# Patient Record
Sex: MALE | URBAN - METROPOLITAN AREA
[De-identification: names, ages, dates, MRNs, and addresses within clinical notes are randomized per-mention and may not be internally consistent; named-entity substitution may affect disease eponyms.]

---

## 2017-09-28 ENCOUNTER — TRANSFERRED RECORDS (OUTPATIENT)
Dept: HEALTH INFORMATION MANAGEMENT | Facility: CLINIC | Age: 37
End: 2017-09-28

## 2017-09-28 ENCOUNTER — MEDICAL CORRESPONDENCE (OUTPATIENT)
Dept: HEALTH INFORMATION MANAGEMENT | Facility: CLINIC | Age: 37
End: 2017-09-28

## 2017-10-04 ENCOUNTER — TRANSFERRED RECORDS (OUTPATIENT)
Dept: HEALTH INFORMATION MANAGEMENT | Facility: CLINIC | Age: 37
End: 2017-10-04

## 2017-12-19 ENCOUNTER — TRANSFERRED RECORDS (OUTPATIENT)
Dept: HEALTH INFORMATION MANAGEMENT | Facility: CLINIC | Age: 37
End: 2017-12-19

## 2018-01-04 NOTE — TELEPHONE ENCOUNTER
APPT INFO    Date /Time: 1/8/18 9:45AM   Reason for Appt: L hand Flexor Rupture in 2015 at [facility]   Ref Provider/Clinic: EL[facility]N/ [facility]    Are there internal records? Yes/No?  IF YES, list clinic names: NO   Are there outside records? Yes/No? YES- see below   Patient Contact (Y/N) & Call Details: NO- referral   Action: Reviewed records     OUTSIDE RECORDS CHECKLIST     CLINIC NAME COMMENTS REC (x) IMG (x)   [facility]              Records Received From: [facility]    Date/Exam/Location  (specify location if different)   Office Notes: 12/19/17   Radiology Reports: - MRI upper ext left 10/4/17  - left hand 9/28/17    Olivia Wheat Notified (Y/N):      ACTION    What did you do? Faxed request for additional records/images

## 2018-01-05 NOTE — TELEPHONE ENCOUNTER
Received Imaging From: [facility]    Image Type (x): Disc:__1_  Pacs:___      Exam Date/Name: 9/28/17 xray L Hand  10/4/17 MR Left Hand Comments: to film room

## 2018-01-08 ENCOUNTER — PRE VISIT (OUTPATIENT)
Dept: ORTHOPEDICS | Facility: CLINIC | Age: 38
End: 2018-01-08

## 2018-01-08 ENCOUNTER — OFFICE VISIT (OUTPATIENT)
Dept: ORTHOPEDICS | Facility: CLINIC | Age: 38
End: 2018-01-08
Payer: COMMERCIAL

## 2018-01-08 VITALS — BODY MASS INDEX: 32.77 KG/M2 | WEIGHT: 203.9 LBS | HEIGHT: 66 IN

## 2018-01-08 DIAGNOSIS — M24.542 CONTRACTURE OF HAND JOINT, LEFT: Primary | ICD-10-CM

## 2018-01-08 ASSESSMENT — ENCOUNTER SYMPTOMS
NECK PAIN: 0
DOUBLE VISION: 0
MUSCLE WEAKNESS: 0
MUSCLE CRAMPS: 0
BACK PAIN: 0
INSOMNIA: 1
JOINT SWELLING: 0
DEPRESSION: 1
EYE WATERING: 1
EYE PAIN: 1
MYALGIAS: 0
ARTHRALGIAS: 1
EYE IRRITATION: 1
NERVOUS/ANXIOUS: 1
DECREASED CONCENTRATION: 1
PANIC: 0
EYE REDNESS: 0
STIFFNESS: 0

## 2018-01-08 NOTE — LETTER
1/8/2018       RE: Feliz Pretty  [address]  [place] [address]     Dear Colleague,    Thank you for referring your patient, Feliz Pretty, to the [facility] at [facility]. Please see a copy of my visit note below.    Date of Service: Jan 8, 2018    Chief Complaint:   Chief Complaint   Patient presents with     Consult     pt states he had surgery November 2014 oh his 4th and 5th phalanx tendon repair done, and since then he is unable to to bend his left ring finger and left small finger,pt states there is minimal pain worse waking up in the am       History of Present Illness: Feliz Pretty is a 37 year old right hand dominant male who presents today for further evaluation of a left hand contracture.  He reports that he was in his usual state of health until November 2014 when a jar broke, injuring the flexor tendons to the small and ring fingers in the nerve to small finger.  Surgery was done to repair the tendons at the [facility].  However, during the therapy session approximately 2 weeks after surgery, the repair ruptured.  He was taken back to surgery and the repair was revised.  However, he moved to [place] shortly after this so had only 2 therapy sessions.  He believes that during 1 of these therapy sessions, the repair to his ring finger may have ruptured again.  He reports a fixed flexion deformity of the small and ring finger since this time.  He is currently a student in journalism.  He does not have much pain in the fingers and it doesn't really effect his daily life but has difficulty putting on gloves.  He also has discomfort when he wakes up in the morning.  He is currently on disability for PTSD. He wants to know if anything can be done to improve his hand function.     Review of Systems: A 14-point review of systems was obtained on intake reviewed. It is included at the bottom of this note.     Past Medical History:  "  Diagnosis Date     Mental disorder          Past Surgical History:   Procedure Laterality Date     C STOMACH SURGERY PROCEDURE UNLISTED         No current outpatient prescriptions on file.    Not on File    Social History     Social History     Marital status: Single     Spouse name: N/A     Number of children: N/A     Years of education: N/A     Social History Main Topics     Smoking status: Never Smoker     Smokeless tobacco: Never Used     Alcohol use Yes     Drug use: No     Sexual activity: Yes     Partners: Female     Birth control/ protection: Condom     Other Topics Concern     None     Social History Narrative     None     Occupation:     No family history on file.    Physical examination:  Height 1.676 m (5' 6\"), weight 92.5 kg (203 lb 14.4 oz).    Well-developed, well-nourished and in no acute distress.  Alert and oriented to surroundings.    On examination of the left hand, surgical incisions are well-healed. There is no erythema or wound breakdown. There are fixed flexion contractures of small and ring fingers. There is altered sensation along the ulnar aspect of the small finger. Otherwise, sensation is intact. There is active MP flexion but active  Flexion of ring and small PIPs and DIPS is not appreciated. However, this is difficult to assess in light of contracture. There is fullness but no erythema about the ring finger volar proximal phalanx. There is pain at the level of the MP joint with attempted flexion of the digits.   PROM is as follows:   Small:   MP 0-90, PIP 90-12, DIP 45-90  Ring:   MP 0-90, PIP 90-12, DIP 45-90  Resting posture of ring and small is extended MP joints, PIP joints at 120 degrees, DIP joints at 45.  Skin is relatively supple but there is some volar skin tightness at PIP joint level.   He does appear to have palmaris tendons present bilaterally      Radiographs: Three views of the left hand were obtained and demonstrate deformity of ring and small as described " above.    MRI was obtained and reviewed and demonstrates bowstrining of flexor tendons of ring and small fingers  With possible thinning and irregularity of flexor tendons of ring finger at level of shaft of proximal phalanx with adjacent fluid collections per the report although this is difficult to assess given the quality of the study.     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 I discussed with him that that this is an extremely difficult problem. The options as I see it are as follows: One is to perform a fusion of the PIP joints in a more functional position. This will make some activities easier, such as putting on gloves. It will not restore his flexor function. It would possibly involve significant shortening of the digits given the amount of deformity that he has and potential problems with the lack of volar skin. It would require prolonged immobilization afterwards to allow for bone healing but would not probably require prolonged postoperative therapy.     The other option is to attempt to try to restore more normal anatomy by reconstructing his pulley system and performing a tenolysis and contracture release. This might require staged surgery with Bernard rods and tendon grafting depending on the status of his tendons. It might require skin grafting depending on skin availability. There would be significant risks and I do not think his function would ever be normal but it would have the chance of significantly improving his function from what it is now. It would require extensive postop therapy.    In any event, Mr. Pretty would like to defer surgical intervention until spring. In the meantime, I would like him to start an aggressive course of  therapy to improve his passive motion. I think his claw deformity is so severe at this time that it would make any surgery significantly more surgically challenging and lower the chance of a good result. This should include desensitization, joint mobilization, tendon gliding, and dynamic splinting if he can tolerate it. I will see him back in a few months to see how he is progressing.       Again, thank you for allowing me to participate in the care of your patient.      Sincerely,    Cristian Alexis MD

## 2018-01-08 NOTE — NURSING NOTE
"Reason For Visit:   Chief Complaint   Patient presents with     Consult     pt states he had surgery November 2014 oh his 4th and 5th phalanx tendon repair done, and since then he is unable to to bend his left ring finger and left small finger,pt states there is minimal pain worse waking up in the am       Primary MD: No primary care provider on file.  Ref. MD:     ?  No    Age: 37 year old    Occupation:None  Currently working? No.  Work status?  On disability. And student.  Date of injury: November 2014  Type of injury: severed by glass  Date of surgery:November 2014  Type of surgery: tendon repair  Smoker: No  Request smoking cessation information: No      Ht 1.676 m (5' 6\")  Wt 92.5 kg (203 lb 14.4 oz)  BMI 32.91 kg/m2      Pain Assessment  Patient Currently in Pain: Yes  0-10 Pain Scale: 5  Primary Pain Location: Finger (Comment which one)    Hand Dominance Evaluation  Hand Dominance: Right          Marquis Teran CMA    "

## 2018-01-08 NOTE — PROGRESS NOTES
Date of Service: Jan 8, 2018    Chief Complaint:   Chief Complaint   Patient presents with     Consult     pt states he had surgery November 2014 oh his 4th and 5th phalanx tendon repair done, and since then he is unable to to bend his left ring finger and left small finger,pt states there is minimal pain worse waking up in the am       History of Present Illness: Feliz Pertty is a 37 year old right hand dominant male who presents today for further evaluation of a left hand contracture.  He reports that he was in his usual state of health until November 2014 when a jar broke, injuring the flexor tendons to the small and ring fingers in the nerve to small finger.  Surgery was done to repair the tendons at the [facility].  However, during the therapy session approximately 2 weeks after surgery, the repair ruptured.  He was taken back to surgery and the repair was revised.  However, he moved to [place] shortly after this so had only 2 therapy sessions.  He believes that during 1 of these therapy sessions, the repair to his ring finger may have ruptured again.  He reports a fixed flexion deformity of the small and ring finger since this time.  He is currently a student in journalism.  He does not have much pain in the fingers and it doesn't really effect his daily life but has difficulty putting on gloves.  He also has discomfort when he wakes up in the morning.  He is currently on disability for PTSD. He wants to know if anything can be done to improve his hand function.     Review of Systems: A 14-point review of systems was obtained on intake reviewed. It is included at the bottom of this note.     Past Medical History:   Diagnosis Date     Mental disorder          Past Surgical History:   Procedure Laterality Date     C STOMACH SURGERY PROCEDURE UNLISTED         No current outpatient prescriptions on file.    Not on File    Social History     Social History     Marital status: Single     Spouse name: N/A      "Number of children: N/A     Years of education: N/A     Social History Main Topics     Smoking status: Never Smoker     Smokeless tobacco: Never Used     Alcohol use Yes     Drug use: No     Sexual activity: Yes     Partners: Female     Birth control/ protection: Condom     Other Topics Concern     None     Social History Narrative     None     Occupation:     No family history on file.    Physical examination:  Height 1.676 m (5' 6\"), weight 92.5 kg (203 lb 14.4 oz).    Well-developed, well-nourished and in no acute distress.  Alert and oriented to surroundings.    On examination of the left hand, surgical incisions are well-healed. There is no erythema or wound breakdown. There are fixed flexion contractures of small and ring fingers. There is altered sensation along the ulnar aspect of the small finger. Otherwise, sensation is intact. There is active MP flexion but active  Flexion of ring and small PIPs and DIPS is not appreciated. However, this is difficult to assess in light of contracture. There is fullness but no erythema about the ring finger volar proximal phalanx. There is pain at the level of the MP joint with attempted flexion of the digits.   PROM is as follows:   Small:   MP 0-90, PIP 90-12, DIP 45-90  Ring:   MP 0-90, PIP 90-12, DIP 45-90  Resting posture of ring and small is extended MP joints, PIP joints at 120 degrees, DIP joints at 45.  Skin is relatively supple but there is some volar skin tightness at PIP joint level.   He does appear to have palmaris tendons present bilaterally      Radiographs: Three views of the left hand were obtained and demonstrate deformity of ring and small as described above.    MRI was obtained and reviewed and demonstrates bowstrining of flexor tendons of ring and small fingers  With possible thinning and irregularity of flexor tendons of ring finger at level of shaft of proximal phalanx with adjacent fluid collections per the report although this is difficult to " assess given the quality of the study.     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 I discussed with him that that this is an extremely difficult problem. The options as I see it are as follows: One is to perform a fusion of the PIP joints in a more functional position. This will make some activities easier, such as putting on gloves. It will not restore his flexor function. It would possibly involve significant shortening of the digits given the amount of deformity that he has and potential problems with the lack of volar skin. It would require prolonged immobilization afterwards to allow for bone healing but would not probably require prolonged postoperative therapy.     The other option is to attempt to try to restore more normal anatomy by reconstructing his pulley system and performing a tenolysis and contracture release. This might require staged surgery with Bernard rods and tendon grafting depending on the status of his tendons. It might require skin grafting depending on skin availability. There would be significant risks and I do not think his function would ever be normal but it would have the chance of significantly improving his function from what it is now. It would require extensive postop therapy.    In any event, Mr. Pretty would like to defer surgical intervention until spring. In the meantime, I would like him to start an aggressive course of therapy to improve his passive motion. I think his claw deformity is so severe at this time that it would make any surgery significantly more surgically challenging and lower the chance of a good result. This should include desensitization, joint mobilization, tendon gliding, and dynamic splinting if  he can tolerate it. I will see him back in a few months to see how he is progressing.     Answers for HPI/ROS submitted by the patient on 1/8/2018   General Symptoms: No  Skin Symptoms: No  HENT Symptoms: No  EYE SYMPTOMS: Yes  HEART SYMPTOMS: No  LUNG SYMPTOMS: No  INTESTINAL SYMPTOMS: No  URINARY SYMPTOMS: No  REPRODUCTIVE SYMPTOMS: No  SKELETAL SYMPTOMS: Yes  BLOOD SYMPTOMS: No  NERVOUS SYSTEM SYMPTOMS: No  MENTAL HEALTH SYMPTOMS: Yes  Eye pain: Yes  Vision loss: No  Dry eyes: No  Watery eyes: Yes  Eye bulging: No  Double vision: No  Flashing of lights: No  Spots: No  Floaters: No  Redness: No  Crossed eyes: No  Tunnel Vision: No  Yellowing of eyes: No  Eye irritation: Yes  Back pain: No  Muscle aches: No  Neck pain: No  Swollen joints: No  Joint pain: Yes  Bone pain: No  Muscle cramps: No  Muscle weakness: No  Joint stiffness: No  Bone fracture: No  Nervous or Anxious: Yes  Depression: Yes  Trouble sleeping: Yes  Trouble thinking or concentrating: Yes  Mood changes: Yes  Panic attacks: No

## 2018-01-08 NOTE — MR AVS SNAPSHOT
After Visit Summary   1/8/2018    Feliz Pretty    MRN: [id number]           Patient Information     Date Of Birth          1980        Visit Information        Provider Department      1/8/2018 9:45 AM Cristian Alexis MD [facility]        Today's Diagnoses     Contracture of hand joint, left    -  1       Follow-ups after your visit        Your next 10 appointments already scheduled     Jan 17, 2018 11:30 AM CST   (Arrive by 11:15 AM)   TAMMY Hand with Ginger Bruner OT   [facility] ([facility])    [address] [place] [id number] [phone]            Mar 05, 2018  7:00 AM CST   (Arrive by 6:45 AM)   RETURN HAND with Cristian Alexis MD   [facility] ([facility])    [address] [place] [id number] [phone]              Who to contact     Please call your clinic at [phone] to:    Ask questions about your health    Make or cancel appointments    Discuss your medicines    Learn about your test results    Speak to your doctor   If you have compliments or concerns about an experience at your clinic, or if you wish to file a complaint, please contact [facility] Patient Relations at [phone] or email us at [email or web address]         Additional Information About Your Visit        MyChart Information     [employer] is an electronic gateway that provides easy, online access to your medical records. With [employer], you can request a clinic appointment, read your test results, renew a prescription or communicate with your care team.     To sign up for [employer] visit the website at [email or web address]   You will be asked to enter the access code listed below, as well as some personal information. Please follow the directions to create your username and password.     Your access code is:  "[email or web address]  Expires: 2018  6:30 AM     Your access code will  in 90 days. If you need help or a new code, please contact your [facility] or call [phone] for assistance.        Care EveryWhere ID     This is your Care EveryWhere ID. This could be used by other organizations to access your [facility] medical records  [id number]        Your Vitals Were     Height BMI (Body Mass Index)                1.676 m (5' 6\") 32.91 kg/m2           Blood Pressure from Last 3 Encounters:   No data found for BP    Weight from Last 3 Encounters:   18 92.5 kg (203 lb 14.4 oz)              Today, you had the following     No orders found for display       Primary Care Provider    None Specified       No primary provider on file.        Equal Access to Services     [facility] : Hadcaridad Junior, waaxda luqadaha, qaybta kaalmada dougie, jermaine carmona . So [facility] [phone].    ATENCIÓN: Si habla español, tiene a cortez disposición servicios gratuitos de asistencia lingüística. Llame al [phone].    We comply with applicable federal civil rights laws and [place] laws. We do not discriminate on the basis of race, color, national origin, age, disability, sex, sexual orientation, or gender identity.            Thank you!     Thank you for choosing [facility]  for your care. Our goal is always to provide you with excellent care. Hearing back from our patients is one way we can continue to improve our services. Please take a few minutes to complete the written survey that you may receive in the mail after your visit with us. Thank you!             Your Updated Medication List - Protect others around you: Learn how to safely use, store and throw away your medicines at www.disposemymeds.org.      Notice  As of 2018 11:25 AM    You have not been prescribed any medications.      "

## 2018-01-22 ENCOUNTER — TELEPHONE (OUTPATIENT)
Dept: ORTHOPEDICS | Facility: CLINIC | Age: 38
End: 2018-01-22

## 2018-01-22 NOTE — TELEPHONE ENCOUNTER
Mr. Pasion called inquiring about treatment options for his flexion contracture. He states that he feels that he would prefer a joint fusion procedure rather than a reconstructive one as his primary complaint at this time is the position of the fingers rather than his inability to move them. I told him that I certainly think this would be reasonable but I would like him to still do some hand therapy first. Given the severity of his contracture, I think improving his passive motion would be helpful in terms of reducing the amount of bony shortening necessary  He also has a lot of pain at the MP level with attempted active flexion of the digits and I think some therapy might help with this. He was in agreement and will arrange [facility] either [facility]

## 2018-02-05 ENCOUNTER — THERAPY VISIT (OUTPATIENT)
Dept: OCCUPATIONAL THERAPY | Facility: CLINIC | Age: 38
End: 2018-02-05
Payer: COMMERCIAL

## 2018-02-05 DIAGNOSIS — M24.542 CONTRACTURE OF FINGER JOINT, LEFT: Primary | ICD-10-CM

## 2018-02-05 DIAGNOSIS — S66.819A: ICD-10-CM

## 2018-02-05 DIAGNOSIS — M79.645 PAIN OF FINGER OF LEFT HAND: ICD-10-CM

## 2018-02-05 DIAGNOSIS — M25.642 STIFFNESS OF FINGER JOINT, LEFT: ICD-10-CM

## 2018-02-05 PROCEDURE — 97760 ORTHOTIC MGMT&TRAING 1ST ENC: CPT | Mod: GO | Performed by: OCCUPATIONAL THERAPIST

## 2018-02-05 PROCEDURE — 97165 OT EVAL LOW COMPLEX 30 MIN: CPT | Mod: GO | Performed by: OCCUPATIONAL THERAPIST

## 2018-02-05 NOTE — MR AVS SNAPSHOT
After Visit Summary   2/5/2018    Feliz Pretty    MRN: [id number]           Patient Information     Date Of Birth          1980        Visit Information        Provider Department      2/5/2018 2:00 PM Nohelia Aggarwal OT [facility]        Today's Diagnoses     Contracture of finger joint, left    -  1    Pain of finger of left hand        Flexor tendon rupture of hand        Stiffness of finger joint, left           Follow-ups after your visit        Your next 10 appointments already scheduled     Feb 12, 2018  3:00 PM CST   TAMMY Hand with SHONDA [facility] ([facility])    [address] [place] [id number] [phone]            Feb 19, 2018  2:30 PM CST   TAMMY Hand with SHONDA Mcghee [facility] ([facility])    [address] [place] [id number] [phone]            Feb 26, 2018 12:30 PM CST   TAMMY Hand with SHONDA Justice [facility] ([facility])    [address] [place] [id number] [phone]            Mar 05, 2018  7:00 AM CST   (Arrive by 6:45 AM)   RETURN HAND with Crsitian Alexis MD   [facility] ([facility])    [address] [place] [id number] [phone]            Mar 05, 2018  8:00 AM CST   TAMMY Hand with Nohelia Aggarwal OT   [facility] ([facility])    [address] [place] [id number] [phone]              Who to contact     If you have questions or need follow up information about today's clinic visit or your schedule please contact [facility] directly at [phone].  Normal or non-critical lab and imaging results will be communicated to you by MyChart, letter or phone within 4 business days after the  clinic has received the results. If you do not hear from us within 7 days, please contact the clinic through MitraSpan or phone. If you have a critical or abnormal lab result, we will notify you by phone as soon as possible.  Submit refill requests through MitraSpan or call your pharmacy and they will forward the refill request to us. Please allow 3 business days for your refill to be completed.          Additional Information About Your Visit        Zayanteharvelingo Information     MitraSpan gives you secure access to your electronic health record. If you see a primary care provider, you can also send messages to your care team and make appointments. If you have questions, please call your primary care clinic.  If you do not have a primary care provider, please call [phone] and they will assist you.        Care EveryWhere ID     This is your Care EveryWhere ID. This could be used by other organizations to access your [place] medical records  [id number]         Blood Pressure from Last 3 Encounters:   No data found for BP    Weight from Last 3 Encounters:   01/08/18 92.5 kg (203 lb 14.4 oz)              We Performed the Following     HC OT EVAL, LOW COMPLEXITY     ORTHOTIC MGMT AND TRAINING, EACH 15 MIN        Primary Care Provider Office Phone # Fax #    Oipsy Erikaar [phone]       [facility] [address] [facility] [address]        Equal Access to Services     FUNMI CALHOUN : Hadii guy sparrowo Soronnaali, waaxda luqadaha, qaybta kaalmada adeegyada, jermaine kilgore. So [facility] [phone].    ATENCIÓN: Si habla español, tiene a cortez disposición servicios gratuitos de asistencia lingüística. Llame al [phone].    We comply with applicable federal civil rights laws and [place] laws. We do not discriminate on the basis of race, color, national origin, age, disability, sex, sexual orientation, or gender identity.            Thank you!     Thank you for choosing [facility]  for your care. Our goal is always to provide you with excellent care. Hearing back from our patients is one way we can continue to improve our services. Please take a few minutes to complete the written survey that you may receive in the mail after your visit with us. Thank you!             Your Updated Medication List - Protect others around you: Learn how to safely use, store and throw away your medicines at www.disposemymeds.org.      Notice  As of 2/5/2018 11:59 PM    You have not been prescribed any medications.

## 2018-02-06 PROBLEM — M79.645 PAIN OF FINGER OF LEFT HAND: Status: ACTIVE | Noted: 2018-02-06

## 2018-02-06 PROBLEM — S66.819A FLEXOR TENDON RUPTURE OF HAND: Status: ACTIVE | Noted: 2018-02-06

## 2018-02-06 PROBLEM — M24.542 CONTRACTURE OF FINGER JOINT, LEFT: Status: ACTIVE | Noted: 2018-02-06

## 2018-02-06 PROBLEM — M25.642 STIFFNESS OF FINGER JOINT, LEFT: Status: ACTIVE | Noted: 2018-02-06

## 2018-02-12 ENCOUNTER — THERAPY VISIT (OUTPATIENT)
Dept: OCCUPATIONAL THERAPY | Facility: CLINIC | Age: 38
End: 2018-02-12
Payer: COMMERCIAL

## 2018-02-12 DIAGNOSIS — S66.819A: ICD-10-CM

## 2018-02-12 DIAGNOSIS — M79.645 PAIN OF FINGER OF LEFT HAND: ICD-10-CM

## 2018-02-12 DIAGNOSIS — M25.642 STIFFNESS OF FINGER JOINT, LEFT: ICD-10-CM

## 2018-02-12 DIAGNOSIS — M24.542 CONTRACTURE OF FINGER JOINT, LEFT: Primary | ICD-10-CM

## 2018-02-12 PROCEDURE — 97763 ORTHC/PROSTC MGMT SBSQ ENC: CPT | Mod: GO | Performed by: OCCUPATIONAL THERAPIST

## 2018-02-12 NOTE — MR AVS SNAPSHOT
After Visit Summary   2/12/2018    Feliz Pretty    MRN: [id number]           Patient Information     Date Of Birth          1980        Visit Information        Provider Department      2/12/2018 3:00 PM Nohelia Aggarwal OT [facility]        Today's Diagnoses     Contracture of finger joint, left    -  1    Pain of finger of left hand        Flexor tendon rupture of hand        Stiffness of finger joint, left           Follow-ups after your visit        Your next 10 appointments already scheduled     Feb 19, 2018  2:30 PM CST   TAMMY Hand with Lashon Pryor OT   [facility] ([facility])    [address] [place] [id number] [phone]            Feb 26, 2018 12:30 PM CST   TAMMY Hand with Nohelia Aggarwal OT   [facility] ([facility])    [address] [place] [id number] [phone]            Mar 05, 2018  7:00 AM CST   (Arrive by 6:45 AM)   RETURN HAND with Cristian Alexis MD   [facility] ([facility])    [address] [place] [address] [id number] [phone]            Mar 05, 2018  8:00 AM CST   TAMMY Hand with Nohelia Aggarwal OT   [facility] ([facility])    [address] [place] [id number] [phone]              Who to contact     If you have questions or need follow up information about today's clinic visit or your schedule please contact [facility] directly at [phone].  Normal or non-critical lab and imaging results will be communicated to you by MyChart, letter or phone within 4 business days after the clinic has received the results. If you do not hear from us within 7 days, please contact the clinic through MyChart or phone. If you have a critical or abnormal lab result, we will notify you by phone as soon as  possible.  Submit refill requests through SuperTruper or call your pharmacy and they will forward the refill request to us. Please allow 3 business days for your refill to be completed.          Additional Information About Your Visit        Vigixhart Information     SuperTruper gives you secure access to your electronic health record. If you see a primary care provider, you can also send messages to your care team and make appointments. If you have questions, please call your primary care clinic.  If you do not have a primary care provider, please call [phone] and they will assist you.        Care EveryWhere ID     This is your Care EveryWhere ID. This could be used by other organizations to access your [place] medical records  [id number]         Blood Pressure from Last 3 Encounters:   No data found for BP    Weight from Last 3 Encounters:   01/08/18 92.5 kg (203 lb 14.4 oz)              We Performed the Following     C OT ORTHOTICS/PROSTH MGMT &/TRAING SBSQ ENCTR, EA 15 MIN        Primary Care Provider Office Phone # Fax # Tqhkv Dorinda [phone]       [facility] [address] [facility] [address]        Equal Access to Services     LEXY [facility] Hospital[facility] : Hadii aad ku hadasho Soomaali, waaxda luqadaha, qaybta kaalmada dougie, jermaine carmona . So [facility] [phone].    ATENCIÓN: Si habla español, tiene a cortez disposición servicios gratuitos de asistencia lingüística. Mare al [phone].    We comply with applicable federal civil rights laws and [place] laws. We do not discriminate on the basis of race, color, national origin, age, disability, sex, sexual orientation, or gender identity.            Thank you!     Thank you for choosing [facility]  for your care. Our goal is always to provide you with excellent care. Hearing back from our patients is one way we can continue to improve our services. Please take a few minutes to complete the written  survey that you may receive in the mail after your visit with us. Thank you!             Your Updated Medication List - Protect others around you: Learn how to safely use, store and throw away your medicines at www.disposemymeds.org.      Notice  As of 2/12/2018  9:23 PM    You have not been prescribed any medications.

## 2018-02-12 NOTE — PROGRESS NOTES
"SOAP note objective information for 2/12/2018.      Current Date:  2/5/2018     Diagnosis: flexion contractures of the L SF and RF  Date of onset:  November 2014  Date of hand therapy orders: 1/8/18  Procedure: In November 2014, pt lacerated the flexor tendons to the L RF and SF and a nerve was injured. This was repaired, then ruptured, and re repaired with subsequent possible rupture again to the RF.   Referring MD: Cristian Alexis MD  Next MD visit: 3/5/18  Precautions: None  Per MD orders: hand therapy to include desensitization, joint mobilization, tendon gliding, and dynamic splinting if tolerated    Objective:  Pain Level Report  VAS(0-10) 2/6/2018   At Rest: 0/10   With Use: 3-4/10   At worst: 6/10     Report of Pain:  Location:  RF and SF, mainly the RF  Pain Quality:  Sharp and Shooting  Frequency: always some pain    Pain is worst:  daytime or nighttime  Exacerbated by:  Mornings are worst  Relieved by:  rest  Progression:  unchanged  Edema:  MILD of the L ring finger around the proximal phalanx  Sensation: Mildly numb to ulnar side of SF. The base of P1 feels \"weird\" to the touch per pt.    VISUAL INSPECTION:  DATE 2/6/2018 2/6/2018    Right Left   Joint alignment: [x]   Normal   []  Comments: []   Normal   []  Comments:   Color:  [x]   Normal   []  Comments: []   Normal   [x]  Comments: mildly reddish, bluish to the P1 of the RF, volar. Per pt the ulnar/volar area had been \"open for a year after surgery\"   Skin / Scar Appearance: [x]   Normal   []  Comments: []   Normal   [x]  Comments: significant scar tissue noted to the volar SF and RF   Other  Ulnar/volar P1 area of RF: tiny red spot where pt notes that he pulled a long stitch out.         ROM:  Pain Report:  - none    + mild    ++ moderate    +++ severe     small Finger 2/5/2018 2/12/2018     AROM(PROM)  Ext/flex L L   MCP -15/77 0/ NT   PIP -77/93 -70/ NT   DIP -50/62 -40/ NT   Total increase in motion  17 degrees     ring Finger 2/5/2018 " 2/12/2018     AROM(PROM) L L   MCP -10/80 NT   PIP -90/90 -77/ NT   DIP -50/55 -25/ NT   Total increase in motion  28 degrees       Home Exercise Program:  Wear cast as much as possible, off for hygiene  Serial cast: FA based, ulnar gutter, with affected fingers in extension per tolerance, removable delta cast with straps / circumferential  (padding to volar fingers, MPs, ulnar head, hand)  2/12/2018  Same, new serial cast    Next Visit:  Static serial casting - finger extension to be increased as tolerated (cast all fingers and then trim for SR, RF)  Keep MPj at 90 degrees

## 2018-02-19 ENCOUNTER — THERAPY VISIT (OUTPATIENT)
Dept: OCCUPATIONAL THERAPY | Facility: CLINIC | Age: 38
End: 2018-02-19
Payer: COMMERCIAL

## 2018-02-19 DIAGNOSIS — M24.542 CONTRACTURE OF FINGER JOINT, LEFT: Primary | ICD-10-CM

## 2018-02-19 DIAGNOSIS — M25.642 STIFFNESS OF FINGER JOINT, LEFT: ICD-10-CM

## 2018-02-19 DIAGNOSIS — S66.819A: ICD-10-CM

## 2018-02-19 DIAGNOSIS — M79.645 PAIN OF FINGER OF LEFT HAND: ICD-10-CM

## 2018-02-19 PROCEDURE — 97763 ORTHC/PROSTC MGMT SBSQ ENC: CPT | Mod: GO | Performed by: OCCUPATIONAL THERAPIST

## 2018-02-19 NOTE — PROGRESS NOTES
"SOAP note objective information for 2/19/2018.       Diagnosis: flexion contractures of the L SF and RF  Date of onset:  November 2014  Date of hand therapy orders: 1/8/18  Procedure: In November 2014, pt lacerated the flexor tendons to the L RF and SF and a nerve was injured. This was repaired, then ruptured, and re repaired with subsequent possible rupture again to the RF.   Referring MD: Cristian Alexis MD  Next MD visit: 3/5/18  Precautions: None  Per MD orders: hand therapy to include desensitization, joint mobilization, tendon gliding, and dynamic splinting if tolerated    Objective:  Pain Level Report  VAS(0-10) 2/6/2018   At Rest: 0/10   With Use: 3-4/10   At worst: 6/10     Report of Pain:  Location:  RF and SF, mainly the RF  Pain Quality:  Sharp and Shooting  Frequency: always some pain    Pain is worst:  daytime or nighttime  Exacerbated by:  Mornings are worst  Relieved by:  rest  Progression:  unchanged  Edema:  MILD of the L ring finger around the proximal phalanx  Sensation: Mildly numb to ulnar side of SF. The base of P1 feels \"weird\" to the touch per pt.    VISUAL INSPECTION:  DATE 2/6/2018 2/6/2018    Right Left   Joint alignment: [x]   Normal   []  Comments: []   Normal   []  Comments:   Color:  [x]   Normal   []  Comments: []   Normal   [x]  Comments: mildly reddish, bluish to the P1 of the RF, volar. Per pt the ulnar/volar area had been \"open for a year after surgery\"   Skin / Scar Appearance: [x]   Normal   []  Comments: []   Normal   [x]  Comments: significant scar tissue noted to the volar SF and RF   Other  Ulnar/volar P1 area of RF: tiny red spot where pt notes that he pulled a long stitch out.         ROM:  Pain Report:  - none    + mild    ++ moderate    +++ severe     small Finger 2/5/2018 2/12/2018 2/19/2018     AROM(PROM)  Ext/flex L L L   MCP -15/77 0/ NT 0/NT   PIP -77/93 -70/ NT -80/ NT   DIP -50/62 -40/ NT -20/  NT   Total increase in motion  17 degrees 10 degrees     ring " Finger 2/5/2018 2/12/2018 2/19/2018     AROM(PROM) L L L   MCP -10/80 NT 0/NT   PIP -90/90 -77/ NT -70/ NT   DIP -50/55 -25/ NT -29/  NT   Total increase in motion  28 degrees 11 degrees       Home Exercise Program:  Wear cast as much as possible, off for hygiene  Serial cast: FA based, ulnar gutter, with affected fingers in extension per tolerance, removable delta cast with straps / circumferential  (padding to volar fingers, MPs, ulnar head, hand)  2/12/2018  Same, new serial cast  2/19/2018  Same, new serial cast    Next Visit:  Static serial casting - finger extension to be increased as tolerated (cast all fingers and then trim for SR, RF)  Keep MPj at 90 degrees

## 2018-02-19 NOTE — MR AVS SNAPSHOT
After Visit Summary   2/19/2018    Feliz Pretty    MRN: [id number]           Patient Information     Date Of Birth          1980        Visit Information        Provider Department      2/19/2018 2:30 PM Nohelia Aggarwal OT [facility]        Today's Diagnoses     Contracture of finger joint, left    -  1    Pain of finger of left hand        Flexor tendon rupture of hand        Stiffness of finger joint, left           Follow-ups after your visit        Your next 10 appointments already scheduled     Feb 26, 2018 12:30 PM CST   TAMMY Hand with Nohelia Aggarwal OT   [facility] ([facility] [place])    [address] [place] [id number] [phone]            Mar 05, 2018  7:00 AM CST   (Arrive by 6:45 AM)   RETURN HAND with Cristian Alexis MD   [facility] ([facility])    [address] [place] [id number] [phone]            Mar 05, 2018  8:00 AM CST   TAMMY Hand with Nohelia Aggarwal OT   [facility] ([facility] [place])    [address] [place] [id number] [phone]              Who to contact     If you have questions or need follow up information about today's clinic visit or your schedule please contact [facility] directly at [phone].  Normal or non-critical lab and imaging results will be communicated to you by MyChart, letter or phone within 4 business days after the clinic has received the results. If you do not hear from us within 7 days, please contact the clinic through MyChart or phone. If you have a critical or abnormal lab result, we will notify you by phone as soon as possible.  Submit refill requests through [employer] or call your pharmacy and they will forward the refill request to us. Please allow 3 business days for your refill to be completed.          Additional Information  About Your Visit        Mobile Max TechnologiesharProximagen Information     Newsle gives you secure access to your electronic health record. If you see a primary care provider, you can also send messages to your care team and make appointments. If you have questions, please call your primary care clinic.  If you do not have a primary care provider, please call [phone] and they will assist you.        Care EveryWhere ID     This is your Care EveryWhere ID. This could be used by other organizations to access your [place] medical records  [id number]         Blood Pressure from Last 3 Encounters:   No data found for BP    Weight from Last 3 Encounters:   01/08/18 92.5 kg (203 lb 14.4 oz)              We Performed the Following     C OT ORTHOTICS/PROSTH MGMT &/TRAING SBSQ ENCTR, EA 15 MIN        Primary Care Provider Office Phone # Fax #    Munira Seth [phone]       [facility] [address] [facility] [address]        Equal Access to Services     FUNMI CALHOUN : Hadii aad ku hadasho Soomaali, waaxda luqadaha, qaybta kaalmada adeegyada, waxay sonalin haydeisyn cristobal carmona . So [facility] [phone].    ATENCIÓN: Si habla español, tiene a cortez disposición servicios gratuitos de asistencia lingüística. Llame al [phone].    We comply with applicable federal civil rights laws and [place] laws. We do not discriminate on the basis of race, color, national origin, age, disability, sex, sexual orientation, or gender identity.            Thank you!     Thank you for choosing [facility]  for your care. Our goal is always to provide you with excellent care. Hearing back from our patients is one way we can continue to improve our services. Please take a few minutes to complete the written survey that you may receive in the mail after your visit with us. Thank you!             Your Updated Medication List - Protect others around you: Learn how to safely use, store and throw away your medicines at  www.disposemymeds.org.      Notice  As of 2/19/2018 10:11 PM    You have not been prescribed any medications.

## 2018-02-26 ENCOUNTER — THERAPY VISIT (OUTPATIENT)
Dept: OCCUPATIONAL THERAPY | Facility: CLINIC | Age: 38
End: 2018-02-26
Payer: COMMERCIAL

## 2018-02-26 DIAGNOSIS — S66.812D RUPTURE OF FLEXOR TENDON OF LEFT HAND, SUBSEQUENT ENCOUNTER: ICD-10-CM

## 2018-02-26 DIAGNOSIS — M24.542 CONTRACTURE OF FINGER JOINT, LEFT: Primary | ICD-10-CM

## 2018-02-26 DIAGNOSIS — M79.645 PAIN OF FINGER OF LEFT HAND: ICD-10-CM

## 2018-02-26 DIAGNOSIS — M25.642 STIFFNESS OF FINGER JOINT, LEFT: ICD-10-CM

## 2018-02-26 PROCEDURE — 97763 ORTHC/PROSTC MGMT SBSQ ENC: CPT | Mod: GO | Performed by: OCCUPATIONAL THERAPIST

## 2018-02-26 NOTE — PROGRESS NOTES
"Hand Therapy Progress Note    Current Date:  2/27/2018    Reporting period is 2/5/18 to 2/27/2018       Diagnosis: flexion contractures of the L SF and RF  Date of onset:  November 2014  Date of hand therapy orders: 1/8/18  Procedure: In November 2014, pt lacerated the flexor tendons to the L RF and SF and a nerve was injured. This was repaired, then ruptured, and re repaired with subsequent possible rupture again to the RF.   Referring MD: Cristian Alexis MD  Next MD visit: 3/5/18  Precautions: None  Per MD orders: hand therapy to include desensitization, joint mobilization, tendon gliding, and dynamic splinting if tolerated    Subjective:   Subjective changes noted by patient:  It is definitely looser. It seems like the skin is even tight.  Functional changes noted by patient: able to open fingers easier for hygiene  Patient has noted adverse reaction to:  None      Objective:  Pain Level Report  VAS(0-10) 2/6/2018 2/26/2018     At Rest: 0/10 0   With Use: 3-4/10 3   At worst: 6/10 6     Report of Pain:  Location:  RF and SF, mainly the RF  Pain Quality:  Sharp and Shooting  Frequency: always some pain    Pain is worst:  daytime or nighttime  Exacerbated by:  Mornings are worst  Relieved by:  rest  Progression:  unchanged  Edema:  MILD of the L ring finger around the proximal phalanx  Sensation: Mildly numb to ulnar side of SF. The base of P1 feels \"weird\" to the touch per pt.    VISUAL INSPECTION:  DATE 2/6/2018 2/6/2018    Right Left   Joint alignment: [x]   Normal   []  Comments: []   Normal   []  Comments:   Color:  [x]   Normal   []  Comments: []   Normal   [x]  Comments: mildly reddish, bluish to the P1 of the RF, volar. Per pt the ulnar/volar area had been \"open for a year after surgery\"   Skin / Scar Appearance: [x]   Normal   []  Comments: []   Normal   [x]  Comments: significant scar tissue noted to the volar SF and RF   Other  Ulnar/volar P1 area of RF: tiny red spot where pt notes that he pulled a long " stitch out.         ROM:  Pain Report:  - none    + mild    ++ moderate    +++ severe     small Finger 2/5/2018 2/12/2018 2/19/2018 2/26/2018     AROM(PROM)  Ext/flex L L L L   MCP -15/77 0/ NT 0/NT    PIP -77/93 -70/ NT -80/ NT -66/NT   DIP -50/62 -40/ NT -20/  NT -29/NT   Total increase in motion  17 degrees 10 degrees      ring Finger 2/5/2018 2/12/2018 2/19/2018 2/26/2018     AROM(PROM) L L L    MCP -10/80 NT 0/NT    PIP -90/90 -77/ NT -70/ NT -76 /NT   DIP -50/55 -25/ NT -29/  NT -17/ NT   Total increase in motion  28 degrees 11 degrees        Assessment:  Response to therapy has been improvement to:  ROM of Fingers: SF and RF extension    Overall Assessment:  Patient would benefit from continued therapy to achieve rehab potential.  Pt has made exceelent progress with approach of static progressive casting thus far in therapy and further progress is expected. Pt needs to maximize passive extension to allow for fusion of joints as salvage procedure after tendon ruptures to the small and ring fingers.  STG/LTG:  STGoals have been reviewed and progress or achievement has occurred;  see goal sheet for details and updates.  LTGoals have been reviewed and progress or achievement has occurred:  see goal sheet for details and updates.    Plan:  Frequency/Duration:  Recommend continuing with the current treatment plan. 1 X week, once daily  for 3 months  Appropriateness of Rx I have re-evaluated this patient and find that the nature, scope, duration and intensity of the therapy is appropriate for the medical condition of the patient.  Treatment Plan:    Continue treatment plan as outlined in initial evaluation    Home Exercise Program:  Wear cast as much as possible, off for hygiene  Serial cast: FA based, ulnar gutter, with affected fingers in extension per tolerance, removable delta cast with straps / circumferential  (padding to volar fingers, MPs, ulnar head, hand)  2/12/2018  Same, new serial  cast  2/19/2018  Same, new serial cast  2/26, same, new serial cast with MP, PIP and DIP joints of SF and RF in extension    Next Visit:  Static serial casting - finger extension to be increased as tolerated (cast all fingers and then trim for SR, RF)  Follow up after MD visit

## 2018-02-26 NOTE — MR AVS SNAPSHOT
After Visit Summary   2/26/2018    Feliz Pretty    MRN: [id number]           Patient Information     Date Of Birth          1980        Visit Information        Provider Department      2/26/2018 12:30 PM Nohelia Aggarwal OT [facility]        Today's Diagnoses     Contracture of finger joint, left    -  1    Pain of finger of left hand        Rupture of flexor tendon of left hand, subsequent encounter        Stiffness of finger joint, left           Follow-ups after your visit        Your next 10 appointments already scheduled     Mar 05, 2018  7:00 AM CST   (Arrive by 6:45 AM)   RETURN HAND with Cristian Alexis MD   [facility] ([facility])    [address] [place] [id number] [phone]            Mar 05, 2018  8:00 AM CST   TAMMY Hand with Nohelia Aggarwal OT   [facility] ([facility])    [address] [place] [id number] [phone]              Who to contact     If you have questions or need follow up information about today's clinic visit or your schedule please contact [facility] directly at [phone].  Normal or non-critical lab and imaging results will be communicated to you by [employer], letter or phone within 4 business days after the clinic has received the results. If you do not hear from us within 7 days, please contact the clinic through [employer] or phone. If you have a critical or abnormal lab result, we will notify you by phone as soon as possible.  Submit refill requests through [employer] or call your pharmacy and they will forward the refill request to us. Please allow 3 business days for your refill to be completed.          Additional Information About Your Visit        [employer] Information     [employer] gives you secure access to your electronic health record. If you see a primary care provider, you can also send messages  to your care team and make appointments. If you have questions, please call your primary care clinic.  If you do not have a primary care provider, please call [phone] and they will assist you.        Care EveryWhere ID     This is your Care EveryWhere ID. This could be used by other organizations to access your [place] medical records  [id number]         Blood Pressure from Last 3 Encounters:   No data found for BP    Weight from Last 3 Encounters:   01/08/18 92.5 kg (203 lb 14.4 oz)              We Performed the Following     C OT ORTHOTICS/PROSTH MGMT &/TRAING SBSQ ENCTR, EA 15 MIN        Primary Care Provider Office Phone # Fax #    Aznicole Seth [phone]       [facility] [address]        Equal Access to Services     FUNMI CALHOUN : Hadii aad ku hadasho Soomaali, waaxda luqadaha, qaybta kaalmada adeegyada, jermaine carmona . So [facility] [phone].    ATENCIÓN: Si habla español, tiene a cortez disposición servicios gratuitos de asistencia lingüística. Llame al [phone].    We comply with applicable federal civil rights laws and [place] laws. We do not discriminate on the basis of race, color, national origin, age, disability, sex, sexual orientation, or gender identity.            Thank you!     Thank you for choosing [facility]  for your care. Our goal is always to provide you with excellent care. Hearing back from our patients is one way we can continue to improve our services. Please take a few minutes to complete the written survey that you may receive in the mail after your visit with us. Thank you!             Your Updated Medication List - Protect others around you: Learn how to safely use, store and throw away your medicines at www.disposemymeds.org.      Notice  As of 2/26/2018 11:59 PM    You have not been prescribed any medications.

## 2018-03-05 ENCOUNTER — OFFICE VISIT (OUTPATIENT)
Dept: ORTHOPEDICS | Facility: CLINIC | Age: 38
End: 2018-03-05
Payer: COMMERCIAL

## 2018-03-05 ENCOUNTER — THERAPY VISIT (OUTPATIENT)
Dept: OCCUPATIONAL THERAPY | Facility: CLINIC | Age: 38
End: 2018-03-05
Payer: COMMERCIAL

## 2018-03-05 VITALS — BODY MASS INDEX: 31.97 KG/M2 | HEIGHT: 66 IN | WEIGHT: 198.9 LBS

## 2018-03-05 DIAGNOSIS — M25.642 STIFFNESS OF FINGER JOINT, LEFT: ICD-10-CM

## 2018-03-05 DIAGNOSIS — S66.812D RUPTURE OF FLEXOR TENDON OF LEFT HAND, SUBSEQUENT ENCOUNTER: ICD-10-CM

## 2018-03-05 DIAGNOSIS — M79.645 PAIN OF FINGER OF LEFT HAND: ICD-10-CM

## 2018-03-05 DIAGNOSIS — M79.642 PAIN OF LEFT HAND: Primary | ICD-10-CM

## 2018-03-05 DIAGNOSIS — M24.542 CONTRACTURE OF FINGER JOINT, LEFT: Primary | ICD-10-CM

## 2018-03-05 PROCEDURE — 97763 ORTHC/PROSTC MGMT SBSQ ENC: CPT | Mod: GO | Performed by: OCCUPATIONAL THERAPIST

## 2018-03-05 NOTE — NURSING NOTE
"Reason For Visit:   Chief Complaint   Patient presents with     RECHECK     pt states he is here to follow up on his 4th and 5th left digit phalanx tendon repair, patient states there is pain today dur to his half cast pressing on his fingers.       Primary MD: Munira Seth  Ref. MD:     ?  No    Age: 37 year old      Date of injury: 11/2015   Type of injury: 4th and 5th phalanx tendon injury.  Date of surgery: 11/2015  Type of surgery: 4th and 5th left digit tendon repair.        Ht 1.676 m (5' 6\")  Wt 90.2 kg (198 lb 14.4 oz)  BMI 32.1 kg/m2      Pain Assessment  Patient Currently in Pain: Yes  0-10 Pain Scale: 7  Primary Pain Location: Finger (Comment which one)  Other Pain Locations:  (4th and 5th left digits)    Hand Dominance Evaluation  Hand Dominance: Right                    Marquis Teran CMA  "

## 2018-03-05 NOTE — LETTER
3/5/2018       RE: Feliz Pretty  [address]  [place] [address]     Dear Colleague,    Thank you for referring your patient, Feliz Pretty, to the [facility] at [facility]. Please see a copy of my visit note below.    Reason for visit: Follow-up left ring and small flexor tendon injuries    Interval events: I first saw the patient on January 8.  Has a history of 2 flexor tendon repairs done at the [facility], the second after the first 1 failed 2 weeks after surgery.  Do not have the operative records.  He had minimal therapy afterwards and now has severe flexion contractures of the ring and small fingers.  At last visit, I referred for therapy for progressive extension splinting.  He reports he has made significant progress with this.  Finds the splint painful but  Suppleness of digits is improved.  Feels that small finger volar skin is quite tight and it is an impediment to his progress.    Physical exam: Surgical scars are well-healed.  On examination of the right small finger, there is a volar incision extending from distal palmar crease to the DIP level.  At the radial border of the volar surface of the proximal phalanx opposite the healed incision, there is a quite tight longitudinal band of skin that appears to limit passive extension.  The ring finger has a palpable bulbous mass at the level of the proximal phalanx.  The surgical incision is over the proximal phalanx.  The skin is somewhat tight here but not clearly limiting his motion.  There is a very hard stop with attempted PIP extension.  Sensation is altered along the ulnar aspect of the small finger.  Sensation otherwise intact .  Fingerswarm and well-perfused.  Motion as follows:  SF:   MP PROM HE-100, AROM HE-100  PIP PROM , AROM   DIP PROM 20-90, AROM 30-60   RF:   MP PROM HE-100, AROM HE-100   PIP PROM , AROM 120-120  DIP PROM 0-90, PROM  30-30    Assessment: History of revision flexor tendon repair at [facility] in 2015.  Now with  tethering scar over small finger proximal phalanx, ring finger PIP joint contracture, apparent intact FDP tendon to small, unclear status of ring finger flexor tendon repair.    Plan: I would like the patient to get an ultrasound to better evaluate flexor tendon integrity.  He will continue hand therapy.  I will see him back after ultrasound is completed.  We also still do not have his records from the original surgery.  We will need to obtain these. He signed a release today.    Again, thank you for allowing me to participate in the care of your patient.      Sincerely,    Cristian Alexis MD

## 2018-03-05 NOTE — MR AVS SNAPSHOT
After Visit Summary   3/5/2018    Feliz Pretty    MRN: [id number]           Patient Information     Date Of Birth          1980        Visit Information        Provider Department      3/5/2018 8:00 AM Nohelia Aggarwal OT [facility]        Today's Diagnoses     Contracture of finger joint, left    -  1    Pain of finger of left hand        Rupture of flexor tendon of left hand, subsequent encounter        Stiffness of finger joint, left           Follow-ups after your visit        Your next 10 appointments already scheduled     Mar 12, 2018 11:30 AM CDT   US EXTREMITY NON VASCULAR LEFT with UCUS1   [facility] ([facility] [place])    [address] [place] [address]  [facility] [id number] [phone]           Please bring a list of your medicines (including vitamins, minerals and over-the-counter drugs). Also, tell your doctor about any allergies you may have. Wear comfortable clothes and leave your valuables at home.  You do not need to do anything special to prepare for your exam.  Please call the Imaging Department at your exam site with any questions.            Mar 14, 2018  8:30 AM CDT   TAMMY Hand with SHON[facility] ([facility] [place])    [address] [place] [address] [id number] [phone]            Mar 26, 2018  8:15 AM CDT   (Arrive by 8:00 AM)   RETURN HAND with Cristian Alexis MD   [facility] ([facility])    [address] [place] [phone] [phone]            Mar 26, 2018  8:30 AM CDT   TAMMY Hand with SHONDA [facility] ([facility] [place])    [address] [place] [phone] [phone]              Future tests that were ordered for you today     Open Future Orders        Priority Expected Expires Ordered    US Extremity  non-vascular LT Routine  3/5/2019 3/5/2018            Who to contact     If you have questions or need follow up information about today's clinic visit or your schedule please contact  [employer] directly at [phone].  Normal or non-critical lab and imaging results will be communicated to you by [employer], letter or phone within 4 business days after the clinic has received the results. If you do not hear from us within 7 days, please contact the clinic through [employer] or phone. If you have a critical or abnormal lab result, we will notify you by phone as soon as possible.  Submit refill requests through [employer] or call your pharmacy and they will forward the refill request to us. Please allow 3 business days for your refill to be completed.          Additional Information About Your Visit        [employer] Information     [employer] gives you secure access to your electronic health record. If you see a primary care provider, you can also send messages to your care team and make appointments. If you have questions, please call your primary care clinic.  If you do not have a primary care provider, please call [phone] and they will assist you.        Care EveryWhere ID     This is your Care EveryWhere ID. This could be used by other organizations to access your [place] medical records  [id number]         Blood Pressure from Last 3 Encounters:   No data found for BP    Weight from Last 3 Encounters:   03/05/18 90.2 kg (198 lb 14.4 oz)   01/08/18 92.5 kg (203 lb 14.4 oz)              We Performed the Following     C OT ORTHOTICS/PROSTH MGMT &/TRAING SBSQ ENCTR, EA 15 MIN        Primary Care Provider Office Phone # Fax #    Aznicole Seth [phone]       [facility] [address]   [facility] [address]        Equal Access to Services     FUNMI CALHOUN : elder Morgan, jermaine velez. So [facility]  [phone].    ATENCIÓN: Si habla braulio, tiene a cortez disposición servicios gratuitos de asistencia lingüística. Llame al [phone].    We comply with applicable federal civil rights laws and [place] laws. We do not discriminate on the basis of race, color, national origin, age, disability, sex, sexual orientation, or gender identity.            Thank you!     Thank you for choosing [facility]  for your care. Our goal is always to provide you with excellent care. Hearing back from our patients is one way we can continue to improve our services. Please take a few minutes to complete the written survey that you may receive in the mail after your visit with us. Thank you!             Your Updated Medication List - Protect others around you: Learn how to safely use, store and throw away your medicines at www.disposemymeds.org.      Notice  As of 3/5/2018  8:19 AM    You have not been prescribed any medications.

## 2018-03-05 NOTE — MR AVS SNAPSHOT
After Visit Summary   3/5/2018    Feliz Pretty    MRN: [id number]           Patient Information     Date Of Birth          1980        Visit Information        Provider Department      3/5/2018 7:00 AM Cristian Alexis MD [facility]        Today's Diagnoses     Pain of left hand    -  1       Follow-ups after your visit        Your next 10 appointments already scheduled     Mar 12, 2018 11:30 AM CDT   US EXTREMITY NON VASCULAR LEFT with UCUS1   [facility] [place] [facility] ([facility])    [address] [place], [place] [id number] [phone]           Please bring a list of your medicines (including vitamins, minerals and over-the-counter drugs). Also, tell your doctor about any allergies you may have. Wear comfortable clothes and leave your valuables at home.  You do not need to do anything special to prepare for your exam.  Please call the Imaging Department at your exam site with any questions.            Mar 14, 2018  8:30 AM CDT   TAMMY Hand with SHONDA [facility] ([facility]    [address] [place] [address] [phone]            Mar 26, 2018  8:15 AM CDT   (Arrive by 8:00 AM)   RETURN HAND with Cristian Alexis MD   [facility] ([facility])    [address] [place] [address] [phone] [phone]            Mar 26, 2018  8:30 AM CDT   TAMMY Hand with Nohelia Aggarwal OT   [facility] ([facility])    [address] [place] [phone]              Future tests that were ordered for you today     Open Future Orders        Priority Expected Expires Ordered    US Extremity non-vascular LT Routine  3/5/2019 3/5/2018            Who to contact     Please call your clinic at [phone] to:    Ask questions about your  "health    Make or cancel appointments    Discuss your medicines    Learn about your test results    Speak to your doctor            Additional Information About Your Visit        [employer] Information     [facility] [employer] gives you secure access to your electronic health record. If you see a primary care provider, you can also send messages to your care team and make appointments. If you have questions, please call your primary care clinic.  If you do not have a primary care provider, please call [phone] and they will assist you.      [facility] is an electronic gateway that provides easy, online access to your medical records. With [facility], you can request a clinic appointment, read your test results, renew a prescription or communicate with your care team.     To access your existing account, please contact your [facility] or call [phone] for assistance.        Care EveryWhere ID     This is your Care EveryWhere ID. This could be used by other organizations to access your [place] medical records  [id number]        Your Vitals Were     Height BMI (Body Mass Index)                1.676 m (5' 6\") 32.1 kg/m2           Blood Pressure from Last 3 Encounters:   No data found for BP    Weight from Last 3 Encounters:   03/05/18 90.2 kg (198 lb 14.4 oz)   01/08/18 92.5 kg (203 lb 14.4 oz)               Primary Care Provider Office Phone # Fax #    Munira Seth [phone]       [facility] [address] [facility] [address]        Equal Access to Services     FUNMI CALHOUN : Hadii guy sparrowo Sowinnie, waaxda luqadaha, qaybta kaalmada adeegyada, jermaine carmona . So [facility] [phone].    ATENCIÓN: Si habla español, tiene a cortez disposición servicios gratuitos de asistencia lingüística. Llame al [phone].    We comply with applicable federal civil rights laws and [place] laws. We do not discriminate on the basis of race, color, " national origin, age, disability, sex, sexual orientation, or gender identity.            Thank you!     Thank you for choosing [facility]  for your care. Our goal is always to provide you with excellent care. Hearing back from our patients is one way we can continue to improve our services. Please take a few minutes to complete the written survey that you may receive in the mail after your visit with us. Thank you!             Your Updated Medication List - Protect others around you: Learn how to safely use, store and throw away your medicines at www.disposemymeds.org.      Notice  As of 3/5/2018  8:18 AM    You have not been prescribed any medications.

## 2018-03-05 NOTE — PROGRESS NOTES
"SOAP note objective information for 3/5/2018.       Diagnosis: flexion contractures of the L SF and RF  Date of onset:  November 2014  Date of hand therapy orders: 1/8/18  Procedure: In November 2014, pt lacerated the flexor tendons to the L RF and SF and a nerve was injured. This was repaired, then ruptured, and re repaired with subsequent possible rupture again to the RF.   Referring MD: Cristian Alexis MD  Next MD visit: 3/5/18  Precautions: None  Per MD orders: hand therapy to include desensitization, joint mobilization, tendon gliding, and dynamic splinting if tolerated    Objective:  Pain Level Report  VAS(0-10) 2/6/2018 2/26/2018     At Rest: 0/10 0   With Use: 3-4/10 3   At worst: 6/10 6     Report of Pain:  Location:  RF and SF, mainly the RF  Pain Quality:  Sharp and Shooting  Frequency: always some pain    Pain is worst:  daytime or nighttime  Exacerbated by:  Mornings are worst  Relieved by:  rest  Progression:  unchanged  Edema:  MILD of the L ring finger around the proximal phalanx  Sensation: Mildly numb to ulnar side of SF. The base of P1 feels \"weird\" to the touch per pt.    VISUAL INSPECTION:  DATE 2/6/2018 2/6/2018    Right Left   Joint alignment: [x]   Normal   []  Comments: []   Normal   []  Comments:   Color:  [x]   Normal   []  Comments: []   Normal   [x]  Comments: mildly reddish, bluish to the P1 of the RF, volar. Per pt the ulnar/volar area had been \"open for a year after surgery\"   Skin / Scar Appearance: [x]   Normal   []  Comments: []   Normal   [x]  Comments: significant scar tissue noted to the volar SF and RF   Other  Ulnar/volar P1 area of RF: tiny red spot where pt notes that he pulled a long stitch out.         ROM:  Pain Report:  - none    + mild    ++ moderate    +++ severe     small Finger 2/5/2018 2/12/2018   2/19/2018   2/26/2018   3/5/2018     AROM(PROM)  Ext/flex L L L L L   MCP -15/77 0/ NT 0/NT  HE   PIP -77/93 -70/ NT -80/ NT -66/NT -71/ NT  (-56/NT)     DIP -50/62 -40/ " NT -20/  NT -29/NT -31/NT  (-21/NT)       Total increase in motion  17 degrees 10 degrees       ring Finger 2/5/2018 2/12/2018   2/19/2018   2/26/2018   3/5/2018     AROM(PROM) L L L  L   MCP -10/80 NT 0/NT  NT   PIP -90/90 -77/ NT -70/ NT -76 /NT -77/NT)  (-75/ NT   DIP -50/55 -25/ NT -29/  NT NT -29/NT  (0/ NT )   Total increase in motion  28 degrees 11 degrees         Home Exercise Program:  Wear cast as much as possible, off for hygiene  Serial cast: FA based, ulnar gutter, with affected fingers in extension per tolerance, removable delta cast with straps / circumferential  2/26 serial cast with MP, PIP and DIP joints of SF and RF in extension  3/5/2018  Added padding and foam to support ring finger volar/dorsal, and also pad of SF tip    Next Visit:  Static serial casting - finger extension to be increased as tolerated (cast all fingers and then trim for SR, RF)  Follow up after MD visit

## 2018-03-09 NOTE — PROGRESS NOTES
Reason for visit: Follow-up left ring and small flexor tendon injuries    Interval events: I first saw the patient on January 8.  Has a history of 2 flexor tendon repairs done at the [facility], the second after the first 1 failed 2 weeks after surgery.  Do not have the operative records.  He had minimal therapy afterwards and now has severe flexion contractures of the ring and small fingers.  At last visit, I referred for therapy for progressive extension splinting.  He reports he has made significant progress with this.  Finds the splint painful but  Suppleness of digits is improved.  Feels that small finger volar skin is quite tight and it is an impediment to his progress.    Physical exam: Surgical scars are well-healed.  On examination of the right small finger, there is a volar incision extending from distal palmar crease to the DIP level.  At the radial border of the volar surface of the proximal phalanx opposite the healed incision, there is a quite tight longitudinal band of skin that appears to limit passive extension.  The ring finger has a palpable bulbous mass at the level of the proximal phalanx.  The surgical incision is over the proximal phalanx.  The skin is somewhat tight here but not clearly limiting his motion.  There is a very hard stop with attempted PIP extension.  Sensation is altered along the ulnar aspect of the small finger.  Sensation otherwise intact .  Fingerswarm and well-perfused.  Motion as follows:  SF:   MP PROM HE-100, AROM HE-100  PIP PROM , AROM   DIP PROM 20-90, AROM 30-60   RF:   MP PROM HE-100, AROM HE-100   PIP PROM , AROM 120-120  DIP PROM 0-90, PROM 30-30    Assessment: History of revision flexor tendon repair at [facility] in 2015.  Now with  tethering scar over small finger proximal phalanx, ring finger PIP joint contracture, apparent intact FDP tendon to small, unclear status of ring finger flexor tendon repair.    Plan: I would like the patient to  get an ultrasound to better evaluate flexor tendon integrity.  He will continue hand therapy.  I will see him back after ultrasound is completed.  We also still do not have his records from the original surgery.  We will need to obtain these. He signed a release today.

## 2018-03-12 ENCOUNTER — RADIANT APPOINTMENT (OUTPATIENT)
Dept: ULTRASOUND IMAGING | Facility: CLINIC | Age: 38
End: 2018-03-12
Attending: ORTHOPAEDIC SURGERY
Payer: COMMERCIAL

## 2018-03-12 DIAGNOSIS — M79.642 PAIN OF LEFT HAND: ICD-10-CM

## 2018-03-13 LAB — RADIOLOGIST FLAGS: NORMAL

## 2018-03-26 ENCOUNTER — THERAPY VISIT (OUTPATIENT)
Dept: OCCUPATIONAL THERAPY | Facility: CLINIC | Age: 38
End: 2018-03-26
Payer: COMMERCIAL

## 2018-03-26 ENCOUNTER — OFFICE VISIT (OUTPATIENT)
Dept: ORTHOPEDICS | Facility: CLINIC | Age: 38
End: 2018-03-26
Payer: COMMERCIAL

## 2018-03-26 VITALS — WEIGHT: 198.7 LBS | BODY MASS INDEX: 31.93 KG/M2 | HEIGHT: 66 IN

## 2018-03-26 DIAGNOSIS — M79.645 PAIN OF FINGER OF LEFT HAND: ICD-10-CM

## 2018-03-26 DIAGNOSIS — M25.642 STIFFNESS OF FINGER JOINT, LEFT: ICD-10-CM

## 2018-03-26 DIAGNOSIS — M24.542 CONTRACTURE OF FINGER JOINT, LEFT: Primary | ICD-10-CM

## 2018-03-26 DIAGNOSIS — M24.542 CONTRACTURE OF HAND JOINT, LEFT: Primary | ICD-10-CM

## 2018-03-26 DIAGNOSIS — S66.812D RUPTURE OF FLEXOR TENDON OF LEFT HAND, SUBSEQUENT ENCOUNTER: ICD-10-CM

## 2018-03-26 PROCEDURE — 97763 ORTHC/PROSTC MGMT SBSQ ENC: CPT | Mod: GO | Performed by: OCCUPATIONAL THERAPIST

## 2018-03-26 NOTE — PROGRESS NOTES
Reason for visit: Follow-up left hand contracture    Interval events: Since last visit, patient has continued serial casting.  No therapy visit since time of last visit with me.  Notes no real change.  Not really having any pain.  He did obtain ultrasound to evaluate tendon continuity and pulleys.  Is busy with schoolwork.      Physical exam: Well-developed, well-nourished, no acute distress.  Alert and oriented to surroundings.  Exam unchanged from last visit on March 5 other than some softening of tight skin over volar proximal phalanx of small finger.      Ultrasound: Ultrasound of the left small and ring finger flexor tendons was performed on March 12 in demonstrated material consistent with suture material and scar tissue over the proximal phalanges.  Both ring and small fingers have intact flexor tendons.  Ring and small finger A3 pulleys and ring for finger A2 pulleys are torn.    Assessment: History of revision flexor tendon repair as a [facility] in 2015 with flexor tendon continuity on MRI but severe PIP flexion contractures as well as pulley incompetence.    Plan: I discussed the treatment options with the patient. I have recommended he maximize the amount of improvement he can get with serial casting. While PIP fusion is an option, right now he does have functioning flexor tendons and I am hopeful that with therapy he can improve his PIP position as much as we would with a fusion without having to sacrifice the mobility that he does have. I discussed with him that this will require continued therapy visits to adjust the cast. I do not recommend tenolysis/pulley reconstruction as I think this would like require multiple surgeries with intensive therapy and with little certainty of obtaining a satisfactory result. He expressed understanding and will continue to work with therapy. He will see me back one more time before the summer.     15 minutes of face to face time was spent with patient of which at  least 50% was spent in counseling.

## 2018-03-26 NOTE — MR AVS SNAPSHOT
After Visit Summary   3/26/2018    Feliz Pretty    MRN: [id number]           Patient Information     Date Of Birth          1980        Visit Information        Provider Department      3/26/2018 8:30 AM Nohelia Aggarwal OT M [facility]        Today's Diagnoses     Contracture of finger joint, left    -  1    Pain of finger of left hand        Rupture of flexor tendon of left hand, subsequent encounter        Stiffness of finger joint, left           Follow-ups after your visit        Your next 10 appointments already scheduled     Apr 11, 2018  4:00 PM CDT   TAMMY Hand with Nohelia Aggarwal OT   [facility] ([place])    [address] [place] [address] [id number] [phone]            Apr 23, 2018  8:00 AM CDT   (Arrive by 7:45 AM)   RETURN HAND with Cristian Alexis MD   [facility] ([place])    [address] [place] [address] [id number] [phone]              Who to contact     If you have questions or need follow up information about today's clinic visit or your schedule please contact [facility] directly at [phone].  Normal or non-critical lab and imaging results will be communicated to you by MyChart, letter or phone within 4 business days after the clinic has received the results. If you do not hear from us within 7 days, please contact the clinic through MyChart or phone. If you have a critical or abnormal lab result, we will notify you by phone as soon as possible.  Submit refill requests through LookBooker or call your pharmacy and they will forward the refill request to us. Please allow 3 business days for your refill to be completed.          Additional Information About Your Visit        10-20 Mediahart Information     LookBooker gives you secure access to your electronic health record. If you see a primary care provider, you can also send messages to  your care team and make appointments. If you have questions, please call your primary care clinic.  If you do not have a primary care provider, please call [phone] and they will assist you.        Care EveryWhere ID     This is your Care EveryWhere ID. This could be used by other organizations to access your [place] medical records  [id number]         Blood Pressure from Last 3 Encounters:   No data found for BP    Weight from Last 3 Encounters:   03/26/18 90.1 kg (198 lb 11.2 oz)   03/05/18 90.2 kg (198 lb 14.4 oz)   01/08/18 92.5 kg (203 lb 14.4 oz)              We Performed the Following     C OT ORTHOTICS/PROSTH MGMT &/TRAING SBSQ ENCTR, EA 15 MIN        Primary Care Provider Office Phone # Fax #    Munira Seth [phone]       [facility] [address] [facility] [address]        Equal Access to Services     FUNMI CALHOUN : Hadii aad ku hadasho Soomaali, waaxda luqadaha, qaybta kaalmada adeegyada, waxay annabel carmona . So [facility] [phone].    ATENCIÓN: Si habla español, tiene a cortez disposición servicios gratuitos de asistencia lingüística. Llame al [phone].    We comply with applicable federal civil rights laws and [place] laws. We do not discriminate on the basis of race, color, national origin, age, disability, sex, sexual orientation, or gender identity.            Thank you!     Thank you for choosing [facility]  for your care. Our goal is always to provide you with excellent care. Hearing back from our patients is one way we can continue to improve our services. Please take a few minutes to complete the written survey that you may receive in the mail after your visit with us. Thank you!             Your Updated Medication List - Protect others around you: Learn how to safely use, store and throw away your medicines at www.disposemymeds.org.      Notice  As of 3/26/2018  1:43 PM    You have not been prescribed any medications.

## 2018-03-26 NOTE — MR AVS SNAPSHOT
After Visit Summary   3/26/2018    Feliz Pretty    MRN: [id number]           Patient Information     Date Of Birth          1980        Visit Information        Provider Department      3/26/2018 8:15 AM Cristian Alexis MD [facility]        Today's Diagnoses     Contracture of hand joint, left    -  1       Follow-ups after your visit        Follow-up notes from your care team     Return in about 4 weeks (around 4/23/2018).      Your next 10 appointments already scheduled     Apr 11, 2018  4:00 PM CDT   TAMMY Hand with Nohelia Aggarwal OT   [facility] ([facility])    [address] [place] [id number] [phone]            Apr 23, 2018  8:00 AM CDT   (Arrive by 7:45 AM)   RETURN HAND with Cristian Alexis MD   [facility] ([facility])    [address] [place] [id number] [phone]              Who to contact     Please call your clinic at [phone] to:    Ask questions about your health    Make or cancel appointments    Discuss your medicines    Learn about your test results    Speak to your doctor            Additional Information About Your Visit        HomuorkharLingohub Information     StuffBuff gives you secure access to your electronic health record. If you see a primary care provider, you can also send messages to your care team and make appointments. If you have questions, please call your primary care clinic.  If you do not have a primary care provider, please call [phone] and they will assist you.      StuffBuff is an electronic gateway that provides easy, online access to your medical records. With StuffBuff, you can request a clinic appointment, read your test results, renew a prescription or communicate with your care team.     To access your existing account, please contact your [facility] or call  "[phone] for assistance.        Care EveryWhere ID     This is your Care EveryWhere ID. This could be used by other organizations to access your [facility] medical records  [id number]        Your Vitals Were     Height BMI (Body Mass Index)                1.676 m (5' 6\") 32.07 kg/m2           Blood Pressure from Last 3 Encounters:   No data found for BP    Weight from Last 3 Encounters:   03/26/18 90.1 kg (198 lb 11.2 oz)   03/05/18 90.2 kg (198 lb 14.4 oz)   01/08/18 92.5 kg (203 lb 14.4 oz)              Today, you had the following     No orders found for display       Primary Care Provider Office Phone # Fax #    Munira Seth [phone]       [facility] [address]        Equal Access to Services     FUNMI CALHOUN : Hadii guy carballo Sowinnie, waaxda luqadaha, qaybta kaalmada dougie, jermaine carmona . So [facility] [phone].    ATENCIÓN: Si habla español, tiene a cortez disposición servicios gratuitos de asistencia lingüística. Llame al [phone].    We comply with applicable federal civil rights laws and [place] laws. We do not discriminate on the basis of race, color, national origin, age, disability, sex, sexual orientation, or gender identity.            Thank you!     Thank you for choosing [facility]  for your care. Our goal is always to provide you with excellent care. Hearing back from our patients is one way we can continue to improve our services. Please take a few minutes to complete the written survey that you may receive in the mail after your visit with us. Thank you!             Your Updated Medication List - Protect others around you: Learn how to safely use, store and throw away your medicines at www.disposemymeds.org.      Notice  As of 3/26/2018 11:59 PM    You have not been prescribed any medications.      "

## 2018-03-26 NOTE — LETTER
3/26/2018       RE: Feliz Pretty  [address]  [place] [address]     Dear Colleague,    Thank you for referring your patient, Feliz Pretty, to the [facility] at [facility]. Please see a copy of my visit note below.    Reason for visit: Follow-up left hand contracture    Interval events: Since last visit, patient has continued serial casting.  No therapy visit since time of last visit with me.  Notes no real change.  Not really having any pain.  He did obtain ultrasound to evaluate tendon continuity and pulleys.  Is busy with schoolwork.      Physical exam: Well-developed, well-nourished, no acute distress.  Alert and oriented to surroundings.  Exam unchanged from last visit on March 5 other than some softening of tight skin over volar proximal phalanx of small finger.      Ultrasound: Ultrasound of the left small and ring finger flexor tendons was performed on March 12 in demonstrated material consistent with suture material and scar tissue over the proximal phalanges.  Both ring and small fingers have intact flexor tendons.  Ring and small finger A3 pulleys and ring for finger A2 pulleys are torn.    Assessment: History of revision flexor tendon repair as a [facility] in 2015 with flexor tendon continuity on MRI but severe PIP flexion contractures as well as pulley incompetence.    Plan: I discussed the treatment options with the patient. I have recommended he maximize the amount of improvement he can get with serial casting. While PIP fusion is an option, right now he does have functioning flexor tendons and I am hopeful that with therapy he can improve his PIP position as much as we would with a fusion without having to sacrifice the mobility that he does have. I discussed with him that this will require continued therapy visits to adjust the cast. I do not recommend tenolysis/pulley reconstruction as I think this would like require  multiple surgeries with intensive therapy and with little certainty of obtaining a satisfactory result. He expressed understanding and will continue to work with therapy. He will see me back one more time before the summer.     15 minutes of face to face time was spent with patient of which at least 50% was spent in counseling.     Again, thank you for allowing me to participate in the care of your patient.      Sincerely,    Cristian Alexis MD

## 2018-03-26 NOTE — NURSING NOTE
".  Reason For Visit:   Chief Complaint   Patient presents with     Hand Pain     left hand flexor rupture follow up       Primary MD: Munira Seth  Ref. MD:     ?  No    Age: 37 year old      Date of injury: 11/2015                       Type of injury: 4th and 5th phalanx tendon injury.  Date of surgery: 11/2015  Type of surgery: 4th and 5th left digit tendon repair.      Ht 1.676 m (5' 6\")  Wt 90.1 kg (198 lb 11.2 oz)  BMI 32.07 kg/m2      Pain Assessment  Patient Currently in Pain: Yes  Primary Pain Location: Hand  Pain Orientation: Left    Hand Dominance Evaluation  Hand Dominance: Right  Pinch force  R hand key force: 6.804 kg (15 lb)  L hand key force: 6.804 kg (15 lb)   force  R hand  level 2 force: 38.6 kg (85 lb)  L hand  level  2 force: 20.4 kg (45 lb)    QuickDASH Assessment  No flowsheet data found.       Not on File    Marquis Teran CMA  "

## 2018-04-23 NOTE — PROGRESS NOTES
Hand Therapy Initial Evaluation    Current Date:  2/5/2018     Diagnosis: flexion contractures of the L SF and RF  Date of onset:  November 2014  Date of hand therapy orders: 1/8/18  Procedure: In November 2014, pt lacerated the flexor tendons to the L RF and SF and a nerve was injured. This was repaired, then ruptured, and re repaired with subsequent possible rupture again to the RF.   Referring MD: Cristian Alexis MD  Next MD visit: 3/5/18  Precautions: None  Per MD orders: hand therapy to include desensitization, joint mobilization, tendon gliding, and dynamic splinting if tolerated    Subjective:  Feliz Pretty is a 37 year old R hand dominant male.    Patient reports symptoms of pain, stiffness/loss of motion, weakness/loss of strength and numbness of the left hand which occurred due to broken glass injury in Nov 2014. Since onset symptoms are Gradually getting worse.  Special tests:  x-ray, MRI.  Previous treatment: prior hand therapy and surgeries.  Therapy from March to April 2015. He notes in 12/2017 there was a pull, pop, bleeding, and he pulled a stitch from the RF.  General health as reported by patient is fair.  Pertinent medical history includes:mental illness, depression , sleep disorder, Medical allergies:none.  Surgical history: hand surgery in 2014 to repair flexor tendons of the RF and SF.  Medication history: sleep.    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   Pt receives disability benefits and is not working.  Job Tasks: computer work  Prior functional level:  no limitations  Barriers include:none  Mobility: No difficulty  Transportation: drives and public transportation  Leisure activities/hobbies: traveling    Functional Outcome Measure:   Please refer to flow sheet.      Objective:  Pain Level Report  VAS(0-10) 2/6/2018   At Rest: 0/10   With Use: 3-4/10   At worst: 6/10     Report of Pain:  Location:  RF and SF, mainly the RF  Pain  "Quality:  Sharp and Shooting  Frequency: always some pain    Pain is worst:  daytime or nighttime  Exacerbated by:  Mornings are worst  Relieved by:  rest  Progression:  unchanged  Edema:  MILD of the L ring finger around the proximal phalanx  Sensation: Mildly numb to ulnar side of SF. The base of P1 feels \"weird\" to the touch per pt.    VISUAL INSPECTION:  DATE 2/6/2018 2/6/2018    Right Left   Joint alignment: [x]   Normal   []  Comments: []   Normal   []  Comments:   Color:  [x]   Normal   []  Comments: []   Normal   [x]  Comments: mildly reddish, bluish to the P1 of the RF, volar. Per pt the ulnar/volar area had been \"open for a year after surgery\"   Skin / Scar Appearance: [x]   Normal   []  Comments: []   Normal   [x]  Comments: significant scar tissue noted to the volar SF and RF   Other  Ulnar/volar P1 area of RF: tiny red spot where pt notes that he pulled a long stitch out.         ROM:  Pain Report:  - none    + mild    ++ moderate    +++ severe     small Finger 2/5/2018   AROM(PROM)  Ext/flex L   MCP -15/77   PIP -77/93   DIP -50/62   HOPKINS      ring Finger 2/5/2018   AROM(PROM) L   MCP -10/80   PIP -90/90   DIP -50/55   HOPKINS        Strength:  deferred    Assessment:  Patient presents with symptoms consistent with diagnosis of L SF and RF flexion contracture after flexor tendon repairs x2,  with conservative intervention. He is considering surgical intervention but needs to gain PROM first.    Patient's limitations or Problem List includes:  Pain, Decreased ROM/motion and Sensory disturbance of the left ring finger and small finger which interferes with the patient's ability to perform Self Care Tasks (dressing), Work Tasks, Recreational Activities, Household Chores and Driving  as compared to previous level of function.    Rehab Potential:  Good - Return to full activity, some limitations    Patient will benefit from skilled Occupational Therapy to increase ROM and decrease pain, edema and adherence of " scarring to return to previous activity level and resume normal daily tasks and to reach their rehab potential.    Barriers to Learning:  No barrier    Communication Issues:  Patient appears to be able to clearly communicate and understand verbal and written communication and follow directions correctly.    Chart Review: Expanded review of medical and/or therapy records and additional review of physical, cognitive or psychosocial history related to current functional performance and Detailed history review with patient    Identified Performance Deficits: dressing, home establishment and management, meal preparation and cleanup, shopping and school    Assessment of Occupational Performance:  3-5 Performance Deficits    Clinical Decision Making (Complexity): Low complexity    Treatment Explanation:  The following has been discussed with the patient:  RX ordered/plan of care  Anticipated outcomes  Possible risks and side effects    Plan:  Frequency:  1 X week, once daily  Duration:  for 2 months (POC - 8 however the [facility] currently has [facility] six visits including MD visits)    Treatment Plan:   Modalities:  Paraffin  Therapeutic Exercise:  AROM, PROM and Blocking  Manual Techniques:  Joint mobilization, Scar mobilization and Myofascial release  Orthotic Fabrication:  Static or dynamic orthoses as indicated:  Finger,  Hand or Forearm based  Self Care:  Self Care Tasks and Ergonomic Considerations  Discharge Plan:  Achieve all LTG.  Independent in home treatment program.  Reach maximal therapeutic benefit.    Home Exercise Program:  Wear cast as much as possible, off for hygiene  Serial cast: FA based, ulnar gutter, with affected fingers in extension per tolerance, removable delta cast with straps / circumferential  (padding to volar fingers, MPs, ulnar head, hand)    Next Visit:  Static serial casting - finger extension to be increased as tolerated (cast all fingers and then trim for SR, RF)           Normal for race

## 2018-07-31 PROBLEM — S66.819A FLEXOR TENDON RUPTURE OF HAND: Status: RESOLVED | Noted: 2018-02-06 | Resolved: 2018-07-31

## 2018-07-31 PROBLEM — M24.542 CONTRACTURE OF FINGER JOINT, LEFT: Status: RESOLVED | Noted: 2018-02-06 | Resolved: 2018-07-31

## 2018-07-31 PROBLEM — M79.645 PAIN OF FINGER OF LEFT HAND: Status: RESOLVED | Noted: 2018-02-06 | Resolved: 2018-07-31

## 2018-07-31 PROBLEM — M25.642 STIFFNESS OF FINGER JOINT, LEFT: Status: RESOLVED | Noted: 2018-02-06 | Resolved: 2018-07-31

## 2018-08-01 NOTE — PROGRESS NOTES
Discharge Summary - Hand Therapy    7/31/2018    Patient did not return to therapy.    This episode of care will be resolved.  Plan: Discharge from hand therapy.    Nohelia Aggarwal MS, OTR/L

## 2020-03-11 ENCOUNTER — HEALTH MAINTENANCE LETTER (OUTPATIENT)
Age: 40
End: 2020-03-11

## 2021-01-03 ENCOUNTER — HEALTH MAINTENANCE LETTER (OUTPATIENT)
Age: 41
End: 2021-01-03

## 2021-04-25 ENCOUNTER — HEALTH MAINTENANCE LETTER (OUTPATIENT)
Age: 41
End: 2021-04-25

## 2021-10-10 ENCOUNTER — HEALTH MAINTENANCE LETTER (OUTPATIENT)
Age: 41
End: 2021-10-10

## 2022-05-21 ENCOUNTER — HEALTH MAINTENANCE LETTER (OUTPATIENT)
Age: 42
End: 2022-05-21

## 2022-09-17 ENCOUNTER — HEALTH MAINTENANCE LETTER (OUTPATIENT)
Age: 42
End: 2022-09-17

## 2023-06-04 ENCOUNTER — HEALTH MAINTENANCE LETTER (OUTPATIENT)
Age: 43
End: 2023-06-04